# Patient Record
Sex: FEMALE | URBAN - METROPOLITAN AREA
[De-identification: names, ages, dates, MRNs, and addresses within clinical notes are randomized per-mention and may not be internally consistent; named-entity substitution may affect disease eponyms.]

---

## 2017-10-19 ENCOUNTER — TRANSFERRED RECORDS (OUTPATIENT)
Dept: HEALTH INFORMATION MANAGEMENT | Facility: CLINIC | Age: 27
End: 2017-10-19

## 2017-10-20 ENCOUNTER — MEDICAL CORRESPONDENCE (OUTPATIENT)
Dept: HEALTH INFORMATION MANAGEMENT | Facility: CLINIC | Age: 27
End: 2017-10-20

## 2017-11-18 ENCOUNTER — HEALTH MAINTENANCE LETTER (OUTPATIENT)
Age: 27
End: 2017-11-18

## 2017-11-20 DIAGNOSIS — H53.10 SUBJECTIVE VISUAL DISTURBANCE: Primary | ICD-10-CM

## 2017-11-21 ENCOUNTER — OFFICE VISIT (OUTPATIENT)
Dept: OPHTHALMOLOGY | Facility: CLINIC | Age: 27
End: 2017-11-21
Attending: OPHTHALMOLOGY
Payer: COMMERCIAL

## 2017-11-21 DIAGNOSIS — H47.333 PSEUDOPAPILLEDEMA, BILATERAL: Primary | ICD-10-CM

## 2017-11-21 DIAGNOSIS — H53.10 SUBJECTIVE VISUAL DISTURBANCE: ICD-10-CM

## 2017-11-21 DIAGNOSIS — Q07.8 ANOMALOUS OPTIC NERVE (H): ICD-10-CM

## 2017-11-21 PROCEDURE — 92083 EXTENDED VISUAL FIELD XM: CPT | Mod: ZF | Performed by: OPHTHALMOLOGY

## 2017-11-21 PROCEDURE — 99213 OFFICE O/P EST LOW 20 MIN: CPT | Mod: ZF | Performed by: TECHNICIAN/TECHNOLOGIST

## 2017-11-21 PROCEDURE — 92133 CPTRZD OPH DX IMG PST SGM ON: CPT | Mod: ZF | Performed by: OPHTHALMOLOGY

## 2017-11-21 PROCEDURE — 92250 FUNDUS PHOTOGRAPHY W/I&R: CPT | Mod: ZF | Performed by: OPHTHALMOLOGY

## 2017-11-21 ASSESSMENT — TONOMETRY
OS_IOP_MMHG: 18
IOP_METHOD: ICARE
OD_IOP_MMHG: 21

## 2017-11-21 ASSESSMENT — VISUAL ACUITY
OD_CC: 20/20
OS_CC: 20/20
METHOD: SNELLEN - LINEAR
CORRECTION_TYPE: GLASSES

## 2017-11-21 ASSESSMENT — REFRACTION_WEARINGRX
OD_AXIS: 123
OS_AXIS: 023
OD_SPHERE: -1.75
OD_CYLINDER: +0.25
OS_CYLINDER: +0.25
OS_SPHERE: -1.00
SPECS_TYPE: SVL

## 2017-11-21 ASSESSMENT — CONF VISUAL FIELD
METHOD: COUNTING FINGERS
OS_NORMAL: 1
OD_NORMAL: 1

## 2017-11-21 NOTE — NURSING NOTE
Chief Complaints and History of Present Illnesses   Patient presents with     Neurologic Evaluation     disorder of optic disc both eyes     HPI    Symptoms:              Comments:  New patient referred for anomaly of optic discs bilaterally, diagnosed recently.   Patient states vision is fine.   Denies headaches.  VIDYA Pinon 11/21/2017 9:02 AM

## 2017-11-21 NOTE — PROGRESS NOTES
Assessment & Plan     Rhianna Mehta is a 27 year old female with the following diagnoses:   1. Subjective visual disturbance       She went for a routine eye exam about 1 month ago.  She has had a bunch of eye exams in the past by Dr. Stoddard in the past.  Dr. Stoddard has always felt that the optic nerves look anomalous and they have not changed, but she wanted Rhianna to get it checked out.  No headache.  No bruit.  No transient visual obscurations.  No double vision.  She is asymptomatic.      Her right optic nerve is slightly tilted but looks normal.  There are spontaneous venous pulsations using the direct ophthalmoscope.  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  Her retinal artery and vein have a significant distance between them.  Normally, the artery and vein abut each other in the nerve head but hers are  by a significant distance.  Her borders are elevated and blurred.  Her optical coherence tomography shows mild elevation LEFT eye only.  The visual field is normal both eyes.  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      Would have her follow up with Dr. Stoddard in 2 years per her normal routine.  Would hold on neuroimaging at this time.               Attending Physician Attestation:  Complete documentation of historical and exam elements from today's encounter can be found in the full encounter summary report (not reduplicated in this progress note).  I personally obtained the chief complaint(s) and history of present illness.  I confirmed and edited as necessary the review of systems, past medical/surgical history, family history, social history, and examination findings as documented by others; and I examined the patient myself.  I personally reviewed the relevant tests, images, and reports as documented above.  I formulated and edited as necessary the assessment and plan and discussed the findings and management plan with the patient and  family. - Hernán Mace MD

## 2017-11-21 NOTE — MR AVS SNAPSHOT
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           Follow-ups after your visit        Who to contact     Please call your clinic at [phone] to:    Ask questions about your health    Make or cancel appointments    Discuss your medicines    Learn about your test results    Speak to your doctor   If you have compliments or concerns about an experience at your clinic, or if you wish to file a complaint, please contact [facility] Patient Relations at [phone] or email us at [email or web address]         Additional Information About Your Visit        MyChart Information     [employer] gives you secure access to your electronic health record. If you see a primary care provider, you can also send messages to your care team and make appointments. If you have questions, please call your primary care clinic.  If you do not have a primary care provider, please call [phone] and they will assist you.      [facility] is an electronic gateway that provides easy, online access to your medical records. With [facility], you can request a clinic appointment, read your test results, renew a prescription or communicate with your care team.     To access your existing account, please contact your [facility] or call [phone] for assistance.        Care EveryWhere ID     This is your Care EveryWhere ID. This could be used by other organizations to access your [facility] medical records  [id number]         Blood Pressure from Last 3 Encounters:   No data found for BP    Weight from Last 3 Encounters:   No  data found for Wt              We Performed the Following     Fundus Photos OU (both eyes)     Glaucoma Top OU     IOP Measurement     OCT Optic Nerve RNFL Spectralis OU (both eyes)        Primary Care Provider Office Phone # Fax #    Jillian Levy [phone]       [place] [id number] [facility] [address]        Equal Access to Services     SANIA FORREST : Hadii aad ku hadasho Soomaali, waaxda luqadaha, qaybta kaalmada adeegyada, eva oliveirain hayaan isaías mobleykadeakash laobed . So [facility] [phone].    ATENCIÓN: Si habla español, tiene a mcnamara disposición servicios gratuitos de asistencia lingüística. Ilana al [phone].    We comply with applicable federal civil rights laws and [place] laws. We do not discriminate on the basis of race, color, national origin, age, disability, sex, sexual orientation, or gender identity.            Thank you!     Thank you for choosing [facility]  for your care. Our goal is always to provide you with excellent care. Hearing back from our patients is one way we can continue to improve our services. Please take a few minutes to complete the written survey that you may receive in the mail after your visit with us. Thank you!             Your Updated Medication List - Protect others around you: Learn how to safely use, store and throw away your medicines at www.disposemymeds.org.      Notice  As of 11/21/2017 10:32 AM    You have not been prescribed any medications.

## 2017-11-21 NOTE — LETTER
2017         RE:  :  MRN: Rhianna Mehta  1990  [id number]     Dear Dr. Stoddard,    Thank you for asking me to see your very pleasant patient, Rhianna Mehta, in neuro-ophthalmic consultation.  I would like to thank you for sending your records and I have summarized them in the history of present illness.  My assessment and plan are below.  For further details, please see my attached clinic note.          Assessment & Plan     Rhianna Mehta is a 27 year old female with the following diagnoses:   1. Subjective visual disturbance       She went for a routine eye exam about 1 month ago.  She has had a bunch of eye exams in the past by Dr. Stoddard in the past.  Dr. Stoddard has always felt that the optic nerves look anomalous and they have not changed, but she wanted Rhianna to get it checked out.  No headache.  No bruit.  No transient visual obscurations.  No double vision.  She is asymptomatic.      Her right optic nerve is slightly tilted but looks normal.  There are spontaneous venous pulsations using the direct ophthalmoscope.  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  Her retinal artery and vein have a significant distance between them.  Normally, the artery and vein abut each other in the nerve head but hers are  by a significant distance.  Her borders are elevated and blurred.  Her optical coherence tomography shows mild elevation LEFT eye only.  The visual field is normal both eyes.  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      Would have her follow up with Dr. Stoddard in 2 years per her normal routine.  Would hold on neuroimaging at this time.        Again, thank you for allowing me to participate in the care of your patient.      Sincerely,    Hernán Mace MD  Professor, Neuro-Ophthalmology  Department of Ophthalmology and Visual Neurosciences  [facility]    CC: MELANIE STODDARD  [facility]  [address]  [place]  [facility] [place] [facility] [address]  VIA Outside Provider Messaging     Jillian Levy NP  [address] [place]  [facility] [address]  VIA Facsimile: [phone]

## 2019-10-03 ENCOUNTER — HEALTH MAINTENANCE LETTER (OUTPATIENT)
Age: 29
End: 2019-10-03

## 2021-01-15 ENCOUNTER — HEALTH MAINTENANCE LETTER (OUTPATIENT)
Age: 31
End: 2021-01-15

## 2021-09-05 ENCOUNTER — HEALTH MAINTENANCE LETTER (OUTPATIENT)
Age: 31
End: 2021-09-05

## 2022-02-20 ENCOUNTER — HEALTH MAINTENANCE LETTER (OUTPATIENT)
Age: 32
End: 2022-02-20

## 2022-10-23 ENCOUNTER — HEALTH MAINTENANCE LETTER (OUTPATIENT)
Age: 32
End: 2022-10-23

## 2023-04-02 ENCOUNTER — HEALTH MAINTENANCE LETTER (OUTPATIENT)
Age: 33
End: 2023-04-02

## 2025-02-25 ENCOUNTER — HOSPITAL ENCOUNTER (EMERGENCY)
Facility: HOSPITAL | Age: 35
Discharge: HOME | End: 2025-02-25
Attending: EMERGENCY MEDICINE | Admitting: EMERGENCY MEDICINE
Payer: COMMERCIAL

## 2025-02-25 ENCOUNTER — APPOINTMENT (OUTPATIENT)
Dept: RADIOLOGY | Facility: HOSPITAL | Age: 35
End: 2025-02-25
Attending: EMERGENCY MEDICINE
Payer: COMMERCIAL

## 2025-02-25 VITALS
OXYGEN SATURATION: 96 % | DIASTOLIC BLOOD PRESSURE: 68 MMHG | SYSTOLIC BLOOD PRESSURE: 119 MMHG | HEART RATE: 68 BPM | WEIGHT: 180 LBS | RESPIRATION RATE: 22 BRPM | TEMPERATURE: 98.1 F

## 2025-02-25 DIAGNOSIS — S82.002A CLOSED DISPLACED FRACTURE OF LEFT PATELLA, UNSPECIFIED FRACTURE MORPHOLOGY, INITIAL ENCOUNTER: ICD-10-CM

## 2025-02-25 PROCEDURE — 250N000011 HC RX IP 250 OP 636: Mod: JZ | Performed by: EMERGENCY MEDICINE

## 2025-02-25 PROCEDURE — 99284 EMERGENCY DEPT VISIT MOD MDM: CPT | Mod: 25

## 2025-02-25 PROCEDURE — 27520 TREAT KNEECAP FRACTURE: CPT | Mod: LT

## 2025-02-25 PROCEDURE — 96374 THER/PROPH/DIAG INJ IV PUSH: CPT

## 2025-02-25 PROCEDURE — 73562 X-RAY EXAM OF KNEE 3: CPT | Mod: LT

## 2025-02-25 RX ORDER — HYDROMORPHONE HYDROCHLORIDE 1 MG/ML
0.5 INJECTION, SOLUTION INTRAMUSCULAR; INTRAVENOUS; SUBCUTANEOUS ONCE
Status: COMPLETED | OUTPATIENT
Start: 2025-02-25 | End: 2025-02-25

## 2025-02-25 RX ORDER — OXYCODONE HYDROCHLORIDE 5 MG/1
5 TABLET ORAL EVERY 4 HOURS PRN
Qty: 12 TABLET | Refills: 0 | Status: SHIPPED | OUTPATIENT
Start: 2025-02-25 | End: 2025-03-01

## 2025-02-25 RX ADMIN — HYDROMORPHONE HYDROCHLORIDE 0.5 MG: 1 INJECTION, SOLUTION INTRAMUSCULAR; INTRAVENOUS; SUBCUTANEOUS at 07:27

## 2025-02-25 ASSESSMENT — ACTIVITIES OF DAILY LIVING (ADL)
ADLS_ACUITY_SCORE: 43
ADLS_ACUITY_SCORE: 41

## 2025-02-25 ASSESSMENT — COLUMBIA-SUICIDE SEVERITY RATING SCALE - C-SSRS
6. HAVE YOU EVER DONE ANYTHING, STARTED TO DO ANYTHING, OR PREPARED TO DO ANYTHING TO END YOUR LIFE?: NO
1. IN THE PAST MONTH, HAVE YOU WISHED YOU WERE DEAD OR WISHED YOU COULD GO TO SLEEP AND NOT WAKE UP?: NO
2. HAVE YOU ACTUALLY HAD ANY THOUGHTS OF KILLING YOURSELF IN THE PAST MONTH?: NO

## 2025-02-25 NOTE — ED TRIAGE NOTES
Patient arrives by private car for evaluation of left knee pain/ deformity after a fall. Fall occurred at approx 0640 this am.

## 2025-02-25 NOTE — DISCHARGE INSTRUCTIONS
Wear the knee mobilizer 24 hours a day.  Follow-up with [facility] this week.  Recommend take the rest of the week off from work to elevate your extremity.  Return to ER for worsening symptoms    You should take ibuprofen, 600mg, three times per day as needed for pain.  You can also use acetaminophen, 650 mg, up to four times per day as needed for pain.  You can use these medications together, there are noconcerning interactions.  If this does not adequately control your pain, you can use 1-2 tabs of the prescribed oxycodone every 6 hours as needed for uncontrolled pain.  If you use this medication, you should not drive orperform other activities that require full attention as this medication may make you drowsy.  Oxycodone, like all opiate pain medications, is potentially habit forming and you should only use the minimum amount necessary tocontrol your pain.

## 2025-02-25 NOTE — Clinical Note
Rhianna Mehta was seen and treated in our emergency department on 2/25/2025.  She may return to work on 03/03/2025.       If you have any questions or concerns, please don't hesitate to call.      Eitan Ingram MD

## 2025-02-25 NOTE — ED PROVIDER NOTES
EMERGENCY DEPARTMENT ENCOUNTER      NAME: Rhianna Mehta  AGE: 35 year old female  YOB: 1990  MRN: [id number]  EVALUATION DATE & TIME: 2/25/2025  7:15 AM    PCP: Jillian Levy    ED PROVIDER: Eitan Ingram MD      Chief Complaint   Patient presents with    Fall    Knee Injury         FINAL IMPRESSION:  1. Closed displaced fracture of left patella, unspecified fracture morphology, initial encounter          ED COURSE & MEDICAL DECISION MAKING:    Pertinent Labs & Imaging studies reviewed. (See chart for details)  35 year old female presents to the Emergency Department for evaluation of left knee pain    Clinically I have concern for patellar fracture.  No puncture wound or open wound to her knee.    Pain well-controlled    7:15 AM Met with patient for initial interview and exam.  7:58 AM I independently reviewed the x-ray imaging and rechecked and updated patient on her results. Knee immobilizer was placed.   8:24 AM I discussed the patient with [facility], Dr. Bazan.   8:32 AM Rechecked and updated patient on the recommendations and discussion with Dr. Bazan.      ED Course as of 02/25/25 0840   Tue Feb 25, 2025   0724 Here with left knee injury.  Appears to be fractured kneecap versus patellar rupture.  Doubt knee dislocation.  Will obtain x-ray however.  Strong peripheral pulses downstream.  Neuro intact distally   0724 Will order IV Dilaudid, obtain x-ray   0752 I independently reviewed x-ray notes patellar fracture with separation of fracture ends   0825 I spoke with [facility] Dr. Woodall and we discussed the x-ray results and the Ditzel of air seen on imaging.  We discussed I did not see evidence of an open fracture or abrasion or puncture wound.   0826 For discharge home with knee immobilizer and follow-up this week with [facility].  Toe-touch as tolerated     Discussed orthopedics.  No need for CT despite Ditzel of air with out evidence of abrasion or puncture wound to  her knee    Plan for Discharged home, crutches, knee immobilizer, oxycodone for breakthrough pain, follow-up orthopedics this week    Medical Decision Making  Obtained supplemental history:Supplemental history obtained?: Documented in chart and EMS  Reviewed external records: External records reviewed?: No  Care impacted by chronic illness:Other: N/A  Did you consider but not order tests?: Work up considered but not performed and documented in chart, if applicable  Did you interpret images independently?: Independent interpretation of ECG and images noted in documentation, when applicable.  Consultation discussion with other provider:Did you involve another provider (consultant, , pharmacy, etc.)?: I discussed the care with another health care provider, see documentation for details.  Discharge. I prescribed additional prescription strength medication(s) as charted. See documentation for any additional details.    MIPS (CTPE, Dental pain, Bentley, Sinusitis, Asthma/COPD, Head Trauma): Not Applicable            At the conclusion of the encounter I discussed the results of all of the tests and the disposition. The questions were answered. The patient or family acknowledged understanding and was agreeable with the care plan.       MEDICATIONS GIVEN IN THE EMERGENCY:  Medications   HYDROmorphone (PF) (DILAUDID) injection 0.5 mg (0.5 mg Intravenous $Given 2/25/25 5469)       NEW PRESCRIPTIONS STARTED AT TODAY'S ER VISIT  New Prescriptions    OXYCODONE (ROXICODONE) 5 MG TABLET    Take 1 tablet (5 mg) by mouth every 4 hours as needed. If pain is not improved with acetaminophen and ibuprofen.          =================================================================    TRIAGE ASSESSMENT:        HPI    Patient information was obtained from: Patient and EMS    Use of : N/A         Rhianna Mehta is a 35 year old female without a pertinent history who presents to this ED via EMS for evaluation of a fall and knee  injury. The patient presents with 6/10 knee pain after sustaining a fall on ice at approximately 6:40 AM. She denies loss of consciousness, weakness, or neurological deficits. Also denies, fever, cough, and chills. She is not on blood thinners and reports no significant medical history or current prescription medications. Does endorse an amoxicillin allergy. No recent travel or exposure to sick contacts.    Per EMS, patient did not lose consciousness. Patient history provided by EMS.     REVIEW OF SYSTEMS   Review of Systems  All systems negative, unless otherwise noted in the HPI.     PAST MEDICAL HISTORY:  No past medical history on file.    PAST SURGICAL HISTORY:  No past surgical history on file.        CURRENT MEDICATIONS:    oxyCODONE (ROXICODONE) 5 MG tablet        ALLERGIES:  Allergies   Allergen Reactions    Amoxicillin Hives       FAMILY HISTORY:  No family history on file.    SOCIAL HISTORY:   Social History     Socioeconomic History    Marital status: Single   Tobacco Use    Smoking status: Never    Smokeless tobacco: Never       VITALS:  /77   Pulse 73   Temp 98.1  F (36.7  C)   Resp 22   Wt 81.6 kg (180 lb)   LMP 02/04/2025 (Approximate)   SpO2 96%     PHYSICAL EXAM      Vitals: /77   Pulse 73   Temp 98.1  F (36.7  C)   Resp 22   Wt 81.6 kg (180 lb)   LMP 02/04/2025 (Approximate)   SpO2 96%   General: Appears in no acute distress, awake, alert, interactive.  Eyes: Conjunctivae non-injected. Sclera anicteric.  HENT: Atraumatic.  Neck: Supple.  Respiratory/Chest: Respiration unlabored.  Abdomen: non distended  Musculoskeletal: Deformity to left knee with what appears to be a fracture  patella. bogginess to anterior left knee, Light touch sensation distallly to foot, compartments are soft.  No abrasion or visible puncture wound.   Skin: Normal color. No rash or diaphoresis.  Neurologic: Face symmetric, moves all extremities spontaneously. Speech clear.  Touch sensation  intact distally to fracture.  Able to wiggle toes.  Psychiatric: Oriented to person, place, and time. Affect appropriate.    LAB:  All pertinent labs reviewed and interpreted.  Results for orders placed or performed during the hospital encounter of 02/25/25   XR Knee Left 3 Views    Impression    IMPRESSION: Acute, comminuted fracture of the mid patella with up to approximately 4.5 cm distraction between the fracture fragments. Small locule of soft tissue gas and associated spinal intervening osseous fragment between the definite fracture   fragments. Soft tissue swelling about the anterior knee. Medial and lateral compartment joint spaces are grossly normal.       RADIOLOGY:  Reviewed all pertinent imaging. Please see official radiology report.  XR Knee Left 3 Views   Final Result   IMPRESSION: Acute, comminuted fracture of the mid patella with up to approximately 4.5 cm distraction between the fracture fragments. Small locule of soft tissue gas and associated spinal intervening osseous fragment between the definite fracture    fragments. Soft tissue swelling about the anterior knee. Medial and lateral compartment joint spaces are grossly normal.            I, Pritesh Garcia, am serving as a scribe to document services personally performed by Eitan Ingram MD based on my observation and the provider's statements to me. I, Eitan Ingram MD, attest that Pritesh Garcia  is acting in a scribe capacity, has observed my performance of the services and has documented them in accordance with my direction.    Eitan Ingram MD  [facility] EMERGENCY DEPARTMENT  [address] [place] [id number]  [phone]      Eitan Ingram MD  02/25/25 0840

## 2025-02-25 NOTE — ED NOTES
Bed: JNED-05  Expected date: 2/25/25  Expected time: 7:14 AM  Means of arrival: Ambulance  Comments:  35 F Fall with knee deformity  SP 9

## 2025-02-25 NOTE — ED NOTES
Instructed patient at great length on use of crutches.  Patient feels comfortable going home and was able to walk with crutches.  Mom and dad are with her.

## 2025-03-13 ENCOUNTER — TRANSFERRED RECORDS (OUTPATIENT)
Dept: HEALTH INFORMATION MANAGEMENT | Facility: CLINIC | Age: 35
End: 2025-03-13
Payer: COMMERCIAL

## 2025-03-23 ENCOUNTER — HEALTH MAINTENANCE LETTER (OUTPATIENT)
Age: 35
End: 2025-03-23